# Patient Record
Sex: FEMALE | URBAN - METROPOLITAN AREA
[De-identification: names, ages, dates, MRNs, and addresses within clinical notes are randomized per-mention and may not be internally consistent; named-entity substitution may affect disease eponyms.]

---

## 2020-01-28 ENCOUNTER — OFFICE VISIT (OUTPATIENT)
Dept: URBAN - METROPOLITAN AREA CLINIC 23 | Facility: CLINIC | Age: 25
End: 2020-01-28
Payer: COMMERCIAL

## 2020-01-28 PROCEDURE — 92250 FUNDUS PHOTOGRAPHY W/I&R: CPT | Performed by: OPHTHALMOLOGY

## 2020-01-28 PROCEDURE — 99204 OFFICE O/P NEW MOD 45 MIN: CPT | Performed by: OPHTHALMOLOGY

## 2020-01-28 PROCEDURE — 76514 ECHO EXAM OF EYE THICKNESS: CPT | Performed by: OPHTHALMOLOGY

## 2020-01-28 PROCEDURE — 92020 GONIOSCOPY: CPT | Performed by: OPHTHALMOLOGY

## 2020-01-28 ASSESSMENT — INTRAOCULAR PRESSURE
OS: 21
OD: 22

## 2020-01-28 ASSESSMENT — KERATOMETRY
OD: 41.13
OS: 41.38

## 2020-01-28 NOTE — IMPRESSION/PLAN
Impression: Open angle with borderline findings, low risk, bilateral: H40.013. Plan: Pt has Glaucoma    Gonio : cbb 1+ pg OU  mild posterior bowing       Pachs: 590/593     Today's IOP :  22, 21 Target IOP low to mid teens Pt confirms Fhx of Glaucoma - Grandfather Left eye is the better seeing eye No  VF Today - Will warrant possibly in the future C/D:  .3-.3 / .5-.5
OCT: (1/28/2020) 50, 64 Pt denies Sulfa Allergy   // Pt denies Lung /Heart dx Pt is currently using : no drops No previous medications used  / Previously used medications : none Plan :
1. Explained that currently there is no obvious vision loss from glaucoma however she has C/D asymmetry (OS>>OD). Condition and IOP stable without medication. Careful observation was discussed and is strongly recommended to prevent possible future vision loss. Will continue to monitor intraocular pressure and testing in clinic at regular intervals. No treatment is being implemented at this time. RTC: 6 Month V/F, OCT and IOP

## 2020-08-11 ENCOUNTER — OFFICE VISIT (OUTPATIENT)
Dept: URBAN - METROPOLITAN AREA CLINIC 23 | Facility: CLINIC | Age: 25
End: 2020-08-11
Payer: COMMERCIAL

## 2020-08-11 DIAGNOSIS — H40.013 OPEN ANGLE WITH BORDERLINE FINDINGS, LOW RISK, BILATERAL: Primary | ICD-10-CM

## 2020-08-11 PROCEDURE — 92012 INTRM OPH EXAM EST PATIENT: CPT | Performed by: OPHTHALMOLOGY

## 2020-08-11 PROCEDURE — 92133 CPTRZD OPH DX IMG PST SGM ON: CPT | Performed by: OPHTHALMOLOGY

## 2020-08-11 PROCEDURE — 92083 EXTENDED VISUAL FIELD XM: CPT | Performed by: OPHTHALMOLOGY

## 2020-08-11 ASSESSMENT — INTRAOCULAR PRESSURE
OD: 20
OS: 20

## 2020-08-11 NOTE — IMPRESSION/PLAN
Impression: Open angle with borderline findings, low risk, bilateral: H40.013. Plan: Pt has Glaucoma    Gonio : cbb 1+ pg OU  mild posterior bowing       Pachs: 590/593     Today's IOP :  22, 21 Target IOP low to mid teens Pt confirms Fhx of Glaucoma - Grandfather Left eye is the better seeing eye VF OU Full 8/11/20 C/D:  .3-.3 / .5-.5
OCT: 8/11/20 91/94 Pt denies Sulfa Allergy   // Pt denies Lung /Heart dx Pt is currently using : no drops No previous medications used  / Previously used medications : none Plan :
1. Explained that currently there is no obvious vision loss from glaucoma however she has C/D asymmetry (OS>>OD). Condition and IOP stable without medication. Careful observation was discussed and is strongly recommended to prevent possible future vision loss. Will continue to monitor intraocular pressure and testing in clinic at regular intervals. No treatment is being implemented at this time.